# Patient Record
Sex: FEMALE | Race: WHITE | Employment: OTHER | URBAN - METROPOLITAN AREA
[De-identification: names, ages, dates, MRNs, and addresses within clinical notes are randomized per-mention and may not be internally consistent; named-entity substitution may affect disease eponyms.]

---

## 2023-10-19 ENCOUNTER — HOSPITAL ENCOUNTER (OUTPATIENT)
Age: 73
Setting detail: THERAPIES SERIES
Discharge: HOME | End: 2023-10-19
Payer: MEDICARE

## 2023-10-19 PROCEDURE — 97162 PT EVAL MOD COMPLEX 30 MIN: CPT | Performed by: PHYSICAL THERAPIST

## 2023-10-19 PROCEDURE — 97110 THERAPEUTIC EXERCISES: CPT | Performed by: PHYSICAL THERAPIST

## 2023-10-23 ENCOUNTER — HOSPITAL ENCOUNTER (OUTPATIENT)
Age: 73
Setting detail: THERAPIES SERIES
Discharge: HOME | End: 2023-10-23
Payer: MEDICARE

## 2023-10-23 PROCEDURE — 97110 THERAPEUTIC EXERCISES: CPT | Performed by: PHYSICAL THERAPIST

## 2023-10-23 NOTE — FLOWSHEET NOTE
[] [address]  [address] [facility] [place] [facility].  [phone]  F: [phone] [] [address] [facility]  [address]   [address]  P: [phone]  F: [phone] [] [address]  [address] [facility]  P: [phone]  F: [phone] [] Luciano Rhoda: [phone]  F: [phone] [] [address]  [address] [facility]   [address]   P: [phone]  F: [phone]  [x] [address] [facility].   P: [phone]  F: [phone] [] [address] [facility]  [address]  P: [phone]  F: [phone] [] [address]  [address] [facility]  [place]: [phone]  F: [phone] [] [address]  [place]: [phone]  F: [phone]      Physical Therapy Daily Treatment Note    Date:  10/23/2023  Patient Name:  Leno Chung    :  1950  MRN: [id number]  Physician: Augie Devi MD                               Insurance: [facility] ONLY  (No [address], no co pay)  Medical Diagnosis: R20.0                  Rehab Codes: R20.2, M54.32  Onset Date: 23                                  Next Dr.'s appt: 2023  Visit# / total visits: 1/10   Progress note for Medicare patient due at visit 10     Cancels/No Shows: 0/0    Subjective:  Reports compliance with initial HEP. Has some questions with technique. Finds initial irritability proximally. Cramping at nighttime.    Pain:  [x] Yes  [] No Location: L PL buttock, thigh, calf Pain Rating: (0-10 scale) 4/10, max

## 2023-11-06 ENCOUNTER — HOSPITAL ENCOUNTER (OUTPATIENT)
Age: 73
Setting detail: THERAPIES SERIES
Discharge: HOME | End: 2023-11-06
Payer: MEDICARE

## 2023-11-06 PROCEDURE — 97110 THERAPEUTIC EXERCISES: CPT | Performed by: PHYSICAL THERAPIST

## 2023-11-06 NOTE — FLOWSHEET NOTE
[] [address]  [address] [facility].  [phone]  F: [phone] [] [address] [facility]  [address]   [address]  P: [phone]  F: [phone] [] [address]  [address] [facility]  P: [phone]  F: [phone] [] [place]: [phone]  F: [phone] [] [address]  [address] [facility]   [address]   P: [phone]  F: [phone]  [x] [address] [facility].   P: [phone]  F: [phone] [] [address] [facility]  [address]  P: [phone]  F: [phone] [] 224 [facility]  [address] [facility]  [place]: [phone]  F: [phone] [] [address] [facility] [address]  [place]: [phone]  F: [phone]      Physical Therapy Daily Treatment Note    Date:  2023  Patient Name:  Ozzie Martinez    :  1950  MRN: [id number]  Physician: Berta Cruz MD                               Insurance: [facility] ONLY  (No auth, no co pay)  Medical Diagnosis: R20.0                  Rehab Codes: R20.2, M54.32  Onset Date: 23                                  Next 's appt: 2023  Visit# / total visits: 3/10   Progress note for Medicare patient due at visit 10     Cancels/No Shows: 0/0    Subjective:  Reports consistent improvement in her presenting symptoms with reduced numbness in her L foot and less cramping throughout her leg. Reduced both in frequency and intensity. Estimates 30-50% better.    Pain:  [x] Yes  [] No Location: L PL